# Patient Record
Sex: FEMALE | Race: BLACK OR AFRICAN AMERICAN
[De-identification: names, ages, dates, MRNs, and addresses within clinical notes are randomized per-mention and may not be internally consistent; named-entity substitution may affect disease eponyms.]

---

## 2018-06-13 ENCOUNTER — HOSPITAL ENCOUNTER (OUTPATIENT)
Dept: HOSPITAL 92 - SCSMAMMO | Age: 53
Discharge: HOME | End: 2018-06-13
Attending: FAMILY MEDICINE
Payer: MEDICARE

## 2018-06-13 DIAGNOSIS — Z12.31: Primary | ICD-10-CM

## 2018-06-13 PROCEDURE — 77067 SCR MAMMO BI INCL CAD: CPT

## 2018-06-13 NOTE — MMO
BILATERAL DIGITAL SCREENING MAMMOGRAMS WITH CAD:

 

Comparison is made with exams of 4/14/16, 9/27/13, and 8/21/13.

 

There are scattered fibroglandular densities.  No suspicious masses, calcifications, or architectural
 distortion is seen.

 

IMPRESSION: 

 

BI-RADS category 1 - negative.  Return to annual mammographic screening. 

 

 

BIRADS 1: Negative 

  Routine annual screening mammography (for women over age 40) 

 

POS: DAPHNE

## 2018-12-12 ENCOUNTER — HOSPITAL ENCOUNTER (OUTPATIENT)
Dept: HOSPITAL 92 - SCSRAD | Age: 53
Discharge: HOME | End: 2018-12-12
Attending: FAMILY MEDICINE
Payer: MEDICARE

## 2018-12-12 DIAGNOSIS — R73.09: ICD-10-CM

## 2018-12-12 DIAGNOSIS — R06.02: Primary | ICD-10-CM

## 2018-12-12 DIAGNOSIS — G40.909: ICD-10-CM

## 2018-12-12 LAB
ALBUMIN SERPL BCG-MCNC: 4.3 G/DL (ref 3.5–5)
ALP SERPL-CCNC: 77 U/L (ref 40–150)
ALT SERPL W P-5'-P-CCNC: 13 U/L (ref 8–55)
ANION GAP SERPL CALC-SCNC: 16 MMOL/L (ref 10–20)
AST SERPL-CCNC: 14 U/L (ref 5–34)
BILIRUB SERPL-MCNC: 0.3 MG/DL (ref 0.2–1.2)
BUN SERPL-MCNC: 14 MG/DL (ref 9.8–20.1)
CALCIUM SERPL-MCNC: 9.6 MG/DL (ref 7.8–10.44)
CHD RISK SERPL-RTO: 3.7 (ref ?–4.5)
CHLORIDE SERPL-SCNC: 106 MMOL/L (ref 98–107)
CHOLEST SERPL-MCNC: 194 MG/DL
CO2 SERPL-SCNC: 23 MMOL/L (ref 22–29)
CREAT CL PREDICTED SERPL C-G-VRATE: 0 ML/MIN (ref 70–130)
GLOBULIN SER CALC-MCNC: 4.8 G/DL (ref 2.4–3.5)
GLUCOSE SERPL-MCNC: 95 MG/DL (ref 70–105)
HDLC SERPL-MCNC: 53 MG/DL
HGB BLD-MCNC: 11.4 G/DL (ref 12–16)
LDLC SERPL CALC-MCNC: 118 MG/DL
MCH RBC QN AUTO: 28.4 PG (ref 27–31)
MCV RBC AUTO: 85.3 FL (ref 78–98)
MDIFF COMPLETE?: YES
PLATELET # BLD AUTO: 205 THOU/UL (ref 130–400)
PLATELET BLD QL SMEAR: (no result)
POTASSIUM SERPL-SCNC: 4.2 MMOL/L (ref 3.5–5.1)
RBC # BLD AUTO: 4.01 MILL/UL (ref 4.2–5.4)
SODIUM SERPL-SCNC: 141 MMOL/L (ref 136–145)
T4 FREE SERPL-MCNC: 1.19 NG/DL (ref 0.7–1.48)
TRIGL SERPL-MCNC: 113 MG/DL (ref ?–150)
TSH SERPL DL<=0.005 MIU/L-ACNC: 0.89 UIU/ML (ref 0.35–4.94)
WBC # BLD AUTO: 6.8 THOU/UL (ref 4.8–10.8)

## 2018-12-12 PROCEDURE — 36415 COLL VENOUS BLD VENIPUNCTURE: CPT

## 2018-12-12 PROCEDURE — 80061 LIPID PANEL: CPT

## 2018-12-12 PROCEDURE — 84443 ASSAY THYROID STIM HORMONE: CPT

## 2018-12-12 PROCEDURE — 84439 ASSAY OF FREE THYROXINE: CPT

## 2018-12-12 PROCEDURE — 71046 X-RAY EXAM CHEST 2 VIEWS: CPT

## 2018-12-12 PROCEDURE — 85027 COMPLETE CBC AUTOMATED: CPT

## 2018-12-12 PROCEDURE — 83036 HEMOGLOBIN GLYCOSYLATED A1C: CPT

## 2018-12-12 PROCEDURE — 85007 BL SMEAR W/DIFF WBC COUNT: CPT

## 2018-12-12 PROCEDURE — 80053 COMPREHEN METABOLIC PANEL: CPT

## 2018-12-12 NOTE — RAD
PA AND LATERAL CHEST XRAY:

 

DATE: 12/12/2018.

 

HISTORY: 

Cough, wheezing, and shortness of breath with onset of symptoms last week.  History of asthma.

 

COMPARISON: 

None available.

 

FINDINGS: 

The cardiac silhouette is at the upper limits of normal in size.  The pulmonary vasculature is within
 normal limits.  Lungs are clear.  Osseous structures are intact.

 

IMPRESSION: 

No acute cardiopulmonary process.

 

POS: H

## 2019-02-01 NOTE — RAD
PORTABLE CHEST 1 VIEW:

 

DATE: 2/1/2019.

TIME: 12:39 p.m.

 

HISTORY: 

Dyspnea, congestion.

 

FINDINGS: 

Comparison is made with the exam of 12/12/2018.

 

The heart size is at upper limits of normal.  The lungs are expanded without focal areas of consolida
tion, pneumothoraces, or pleural effusions. 

 

IMPRESSION: 

No radiographic evidence of acute cardiopulmonary process.

 

POS: C

## 2019-02-01 NOTE — CT
CT ANGIO CHEST PERFORMED WITH IV CONTRAST ENHANCEMENT WITH 3D RECONSTRUCTIONS:

 

Date:  02/01/19 

 

HISTORY:  

Increasing shortness of breath in the past week. Elevated D-Dimer. 

 

FINDINGS:

The lungs are clear of any confluent infiltrative process. There is some minimal ground-glass opacity
 seen within the right lung. There is no significant mediastinal, hilar, or axillary adenopathy. 

 

The thoracic aorta is normal in caliber. 

 

There is fair pulmonary artery opacification. Peripheral emboli are not excluded, but I see no signs 
of any embolus. 

 

There is suggestion of fatty changes of the liver. There is a large left adrenal mass. It measures 3.
9 cm in size. CT Hounsfield unit numbers are indeterminate, but probably still represents an adenoma.
 CT using adrenal mass protocol would be needed for confirmation. 

 

IMPRESSION: 

1.  No CT evidence for pulmonary embolus. 

2.  Incompletely characterized left adrenal lesion. 

 

 

POS: [facility]

## 2019-03-28 NOTE — CT
CT ABDOMEN WITH AND WITHOUT CONTRAST:

 

Date:  03/28/19 

 

Multiple axial tomograms obtained through the abdomen pre and post IV contrast. Postcontrast images w
ere obtained in portal venous phase and delayed venous phase following adrenal protocol. 

 

INDICATION:

Assess left adrenal mass which was noted on CT chest dated 02/01/19. 

 

FINDINGS:

There is a left adrenal mass measuring up to 3.2 cm in coronal plane. 

 

The precontrast density of this mass is measured at -2.3, which would indicate a benign adenoma. Wash
out values also indicate a benign adenoma. The portal venous phase density is recorded at 62. The del
ayed venous phase density is recorded at 23 Hounsfield units. 

 

The liver, spleen, and pancreas are unremarkable. Right adrenal gland appears normal. Kidneys are unr
emarkable. Visualized bowel loops are unremarkable. 

 

IMPRESSION: 

Left adrenal mass has density and washout values indicating benign adenoma. 

 

 

POS: WOJCIECH

## 2020-09-01 ENCOUNTER — HOSPITAL ENCOUNTER (OUTPATIENT)
Dept: HOSPITAL 92 - BICMAMMO | Age: 55
Discharge: HOME | End: 2020-09-01
Attending: FAMILY MEDICINE
Payer: MEDICARE

## 2020-09-01 DIAGNOSIS — R92.1: ICD-10-CM

## 2020-09-01 DIAGNOSIS — Z12.31: Primary | ICD-10-CM

## 2020-09-01 DIAGNOSIS — N63.11: ICD-10-CM

## 2020-09-01 PROCEDURE — 77067 SCR MAMMO BI INCL CAD: CPT

## 2020-09-01 PROCEDURE — 77063 BREAST TOMOSYNTHESIS BI: CPT

## 2020-09-01 NOTE — MMO
Bilateral MAMMO Bilat Screen [facility].

 

CLINICAL HISTORY:

Patient is 54 years old and is seen for screening. The patient has no family

history of breast cancer.  The patient has no personal history of cancer.

 

VIEWS:

The views performed were:  bilateral craniocaudal with tomosynthesis and

bilateral mediolateral oblique with tomosynthesis.

 

FILMS COMPARED:

The present examination has been compared to prior imaging studies performed at

[facility] on 04/04/2016, 06/13/2018 and 07/09/2019, and at

[facility] on 09/27/2013.

 

This study has been interpreted with the assistance of computer-aided detection.

 

MAMMOGRAM FINDINGS:

The breasts are heterogeneously dense, which could obscure a lesion on

mammography.

 

Finding 1:  There are stable benign appearing calcifications seen in both

breasts.

 

Finding 2:  There is a round mass measuring 4 millimeters with circumscribed

margins seen in the anterior upper-outer region of the right breast.

 

IMPRESSION:

FINDING 1:  STABLE CALCIFICATIONS IN BOTH BREASTS ARE BENIGN.

 

FINDING 2:  MASS IN THE RIGHT BREAST REQUIRES ADDITIONAL EVALUATION. AN

ULTRASOUND EXAM IS RECOMMENDED. ADDITIONAL IMAGING.

 

THE RESULTS OF THIS EXAM WERE SENT TO THE PATIENT.

 

ACR BI-RADS Category 0 - Incomplete:  Need additional imaging evaluation. [facility] will notify the patient of the need for additional imaging services.

 

MAMMOGRAPHY NOTE:

 1. A negative mammogram report should not delay a biopsy if a dominant of

 clinically suspicious mass is present.

 2. Approximately 10% to 15% of breast cancers are not detected by

 mammography.

 3. Adenosis and dense breasts may obscure an underlying neoplasm.

 

 

Reported by: CORDELIA BOWERS MD

Electonically Signed: [id number]

## 2020-09-04 ENCOUNTER — HOSPITAL ENCOUNTER (OUTPATIENT)
Dept: HOSPITAL 92 - BICULT | Age: 55
Discharge: HOME | End: 2020-09-04
Attending: FAMILY MEDICINE
Payer: MEDICARE

## 2020-09-04 DIAGNOSIS — N63.10: Primary | ICD-10-CM

## 2020-09-04 NOTE — ULT
US Breast Limited Rt: 9/4/2020 12:00 AM



CLINICAL INDICATION: Right breast mass on mammography in the subareolar breast.



COMPARISON: Mammogram 9/1/2020



TECHNIQUE: Multiplanar grayscale and color Doppler images were obtained of the breast.



FINDINGS: 

There is a well-circumscribed hypoechoic mass which likely represents a complex cyst measuring 4 mm i
n greatest dimension.  No suspicious mass is seen.   No suspicious shadowing is seen. 



IMPRESSION: BI-RADS Category 2-benign findings.



Reported By: Blake Liang 

Electronically Signed:  9/4/2020 9:35 AM

## 2023-09-18 NOTE — MMO
Bilateral MAMMO Bilat Screen [facility].

 

CLINICAL HISTORY:

Patient is 53 years old and is seen for screening. The patient has no family

history of breast cancer.  The patient has no personal history of cancer.

 

VIEWS:

The views performed were:  bilateral craniocaudal and bilateral mediolateral

oblique.

 

FILMS COMPARED:

The present examination has been compared to prior imaging studies performed at

[facility] on 04/04/2016 and 06/13/2018, and at [facility] on 08/21/2013 and 09/27/2013.

 

This study has been interpreted with the assistance of computer-aided detection.

 

MAMMOGRAM FINDINGS:

The breasts are heterogeneously dense, which could obscure a lesion on

mammography.

 

There are benign appearing calcifications seen in both breasts.

 

There are no suspicious masses, suspicious calcifications, or new areas of

architectural distortion.

 

IMPRESSION:

THERE IS NO MAMMOGRAPHIC EVIDENCE OF MALIGNANCY.

 

A ROUTINE FOLLOW-UP MAMMOGRAM IN 1 YEAR IS RECOMMENDED.

 

ACR BI-RADS Category 2 - Benign finding

 

MAMMOGRAPHY NOTE:

 1. A negative mammogram report should not delay a biopsy if a dominant of

 clinically suspicious mass is present.

 2. Approximately 10% to 15% of breast cancers are not detected by

 mammography.

 3. Adenosis and dense breasts may obscure an underlying neoplasm.
Patient/Family

## 2024-12-05 ENCOUNTER — HOSPITAL ENCOUNTER (OUTPATIENT)
Dept: HOSPITAL 18 - NAV RAD | Age: 59
Discharge: HOME | End: 2024-12-05
Payer: COMMERCIAL

## 2024-12-05 DIAGNOSIS — D86.3: Primary | ICD-10-CM

## 2024-12-05 PROCEDURE — 71046 X-RAY EXAM CHEST 2 VIEWS: CPT
